# Patient Record
Sex: MALE | Race: WHITE
[De-identification: names, ages, dates, MRNs, and addresses within clinical notes are randomized per-mention and may not be internally consistent; named-entity substitution may affect disease eponyms.]

---

## 2018-11-27 ENCOUNTER — HOSPITAL ENCOUNTER (EMERGENCY)
Dept: HOSPITAL 31 - C.ER | Age: 53
Discharge: HOME | End: 2018-11-27
Payer: COMMERCIAL

## 2018-11-27 VITALS
TEMPERATURE: 98.1 F | OXYGEN SATURATION: 97 % | DIASTOLIC BLOOD PRESSURE: 87 MMHG | SYSTOLIC BLOOD PRESSURE: 148 MMHG | HEART RATE: 67 BPM

## 2018-11-27 VITALS — BODY MASS INDEX: 32.8 KG/M2
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
## 2018-11-27 DIAGNOSIS — S91.332A: Primary | ICD-10-CM

## 2018-11-27 NOTE — C.PDOC
History Of Present Illness





52 year old male presents to the ED for worsening localized left foot pain 

caused by a puncture wound s/p nail injury sustained 5 days ago. Patient reports

he was able to ambulate but the pain has since increased and worse when bearing 

weight. Denies weakness or numbness of the left foot/leg, deformities to the 

left foot, fever, and any other associated symptoms. 





Time Seen by Provider: 11/27/18 18:00


Chief Complaint (Nursing): Abnormal Labs


History Per: Patient


History/Exam Limitations: no limitations


Onset/Duration Of Symptoms: Days


Current Symptoms Are (Timing): Still Present





Past Medical History


Reviewed: Historical Data, Nursing Documentation, Vital Signs


Vital Signs: 





                                Last Vital Signs











Temp  98.1 F   11/27/18 18:08


 


Pulse  67   11/27/18 18:08


 


Resp      


 


BP  148/87   11/27/18 18:08


 


Pulse Ox  97   11/27/18 18:08














- [facility] Procedures











TETANUS TOXOID ADMINIST (06/30/15)








Family History: States: Unknown Family Hx





- Social History


Hx Tobacco Use: Yes


Hx Alcohol Use: Yes


Hx Substance Use: No





- Immunization History


Hx Tetanus Toxoid Vaccination: Yes (UTD)





Review Of Systems


Except As Marked, All Systems Reviewed And Found Negative.


Constitutional: Negative for: Fever


Musculoskeletal: Positive for: Foot Pain (left. ).  Negative for: Other 

(deformities of the left foot. )


Neurological: Negative for: Weakness, Numbness, Incoordination





Physical Exam





- Physical Exam


Appears: Well, Non-toxic, No Acute Distress


Skin: Warm, Dry, Other (x22 puncture wounds to the plantar aspect left of the 

left foot. (-)edema, (-) wound discharge., (-) proximal streaking or erythema.)


Head: Normacephalic


Eye(s): bilateral: PERRL


Oral Mucosa: Moist


Extremity: Normal ROM (of the left foot. ), No Tenderness, No Calf Tenderness, 

Capillary Refill (less than 2 seconds.), No Deformity, No Swelling


Pulses: Left Dorsalis Pedis: Normal, Right Dorsalis Pedis: Normal


Neurological/Psych: Oriented x3, Normal Speech, Normal Cognition, Normal Motor, 

Normal Sensation, Normal Reflexes


Gait: Unsteady (secondary to the pain.)





ED Course And Treatment


O2 Sat by Pulse Oximetry: 97 (RA)


Pulse Ox Interpretation: Normal





- Other Rad


  ** Left foot


X-Ray: Interpreted by Me, Viewed By Me


Interpretation: (-) acute fx or dislocation, no FB


Progress Note: Plan: LT Foot xray. Adacel. Cirpo.  On re-eval, pt is afebrile, 

hemodynamicaly stable.  Ambulatory in ED with stable gait. NOn-toxic. Tolerate 

PO well in ED. Left foot: (+) puncture wound, no edema, no cellulitis, no 

proximal streaking, no discharge. FAROM, no neurovascular deficits.  

neuorlogicaly intact.  XRay review (-) acute findings. Pt advised.  ref. to f/u 

with Podiatry In 1-2 days for re-eavl.  return if any worsening or new changes.





Disposition


Counseled Patient/Family Regarding: Studies Performed, Diagnosis, Need For 

Followup, Rx Given





- Disposition


Referrals: 


[facility] at [facility] [Outside]


Disposition: HOME/ ROUTINE


Disposition Time: 19:00


Condition: STABLE


Additional Instructions: 


Warm salty water foot soaks daily for 10 minutes


take medication as prescribed


follow up with Podiatry Clinic on Monday from 12 PM- 3PM for re-evaluation as 

need


return to ED if any worsening or new changes.


Prescriptions: 


Ciprofloxacin [Cipro] 1 tab PO BID #14 tab


Instructions:  Wound Care


Forms:  [facility] (English)


Print Language: Armenian





- Clinical Impression


Clinical Impression: 


 Puncture wound








- PA / NP / Resident Statement


MD/DO has reviewed & agrees with the documentation as recorded.





- Scribe Statement


The provider has reviewed the documentation as recorded by the Scribe (Nona Powers)





All medical record entries made by the Scribe were at my direction and 

personally dictated by me. I have reviewed the chart and agree that the record 

accurately reflects my personal performance of the history, physical exam, med

[facility] decision making, and the department course for this patient. I have also 

personally directed, reviewed, and agree with the discharge instructions and 

disposition.

## 2018-11-28 NOTE — RAD
Date of service: 



11/27/2018



PROCEDURE:  Left Foot Radiographs.



HISTORY:

 injury 



COMPARISON:

None.



FINDINGS:



BONES:

No acute fracture or destructive bony lesion identified.



JOINTS:

No subluxation or dislocation identified.  Joint space narrowing and 

articular cortical sclerosis at the 1st metatarsophalangeal joint 

indicate degenerative joint disease. 



SOFT TISSUES:

Normal. 



OTHER FINDINGS:

None.



IMPRESSION:

Mild-to-moderate degenerative joint disease 1st metatarsophalangeal 

joint.  No acute fracture or destructive bony lesion appreciated 

throughout the left foot.  No dislocation.